# Patient Record
Sex: MALE | NOT HISPANIC OR LATINO
[De-identification: names, ages, dates, MRNs, and addresses within clinical notes are randomized per-mention and may not be internally consistent; named-entity substitution may affect disease eponyms.]

---

## 2020-04-01 PROBLEM — Z00.00 ENCOUNTER FOR PREVENTIVE HEALTH EXAMINATION: Status: ACTIVE | Noted: 2020-04-01

## 2020-06-05 ENCOUNTER — APPOINTMENT (OUTPATIENT)
Dept: UROLOGY | Facility: CLINIC | Age: 35
End: 2020-06-05

## 2022-03-11 ENCOUNTER — APPOINTMENT (OUTPATIENT)
Dept: UROLOGY | Facility: CLINIC | Age: 37
End: 2022-03-11
Payer: COMMERCIAL

## 2022-03-11 VITALS — HEIGHT: 72 IN | BODY MASS INDEX: 28.44 KG/M2 | TEMPERATURE: 98 F | WEIGHT: 210 LBS

## 2022-03-11 DIAGNOSIS — N34.2 OTHER URETHRITIS: ICD-10-CM

## 2022-03-11 DIAGNOSIS — Z78.9 OTHER SPECIFIED HEALTH STATUS: ICD-10-CM

## 2022-03-11 DIAGNOSIS — Z84.1 FAMILY HISTORY OF DISORDERS OF KIDNEY AND URETER: ICD-10-CM

## 2022-03-11 PROCEDURE — 99203 OFFICE O/P NEW LOW 30 MIN: CPT

## 2022-03-11 NOTE — ASSESSMENT
[FreeTextEntry1] : Mild resolving Mahesh Christos.  I do not see anything of concern.  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  We discussed the issues in detail and I have given him some Pyridium to use on a as needed basis as culture previously was negative and urine today is clear.  If this persists he will return and will have a ureteroscopy performed.  All his questions were otherwise answered.

## 2022-03-11 NOTE — PHYSICAL EXAM
[Abdomen Tenderness] : non-tender [Abdomen Soft] : soft [] : no hepato-splenomegaly [Abdomen Mass (___ Cm)] : no abdominal mass palpated [Abdomen Hernia] : no hernia was discovered [Costovertebral Angle Tenderness] : no ~M costovertebral angle tenderness [Penis Abnormality] : normal circumcised penis [Epididymis] : the epididymides were normal [Scrotum] : the scrotum was normal [Testes Mass (___cm)] : there were no testicular masses [FreeTextEntry1] : Right testis is moderately atrophic and minimally tender.  The meatus is slightly larger than normal.  There is minimal erythema approximately 2 to 3 mm into the meatus especially along the ventral aspect.

## 2023-08-14 NOTE — HISTORY OF PRESENT ILLNESS
[FreeTextEntry1] : 36-year-old male who noticed some burning and irritation when voiding and some sensitivity of the meatus and went to urgent care.  Apparently he was treated with Bactrim for 7 days although the urine did not grow out anything on culture according to the patient.  He had no hematuria or prior history of any UTIs.  His feels that his symptoms are resolving.  Interestingly he also had some irritation in the thigh area but that also seems to be resolving.  His IPSS is 3.\par \par Past medical history: Unremarkable past surgical history: Unremarkable\par \par Allergies: None known\par \par \par Medications: None\par \par \par Review of systems: Otherwise noncontributory with good appetite, stable weight and regular bowels.  No chest pain shortness of breath etc.\par \par He denies any history of tobacco or alcohol use.\par \par  Benzoyl Peroxide Counseling: Patient counseled that medicine may cause skin irritation and bleach clothing.  In the event of skin irritation, the patient was advised to reduce the amount of the drug applied or use it less frequently.   The patient verbalized understanding of the proper use and possible adverse effects of benzoyl peroxide.  All of the patient's questions and concerns were addressed.

## 2024-07-29 ENCOUNTER — APPOINTMENT (OUTPATIENT)
Dept: ORTHOPEDIC SURGERY | Facility: CLINIC | Age: 39
End: 2024-07-29
Payer: COMMERCIAL

## 2024-07-29 DIAGNOSIS — M77.11 LATERAL EPICONDYLITIS, RIGHT ELBOW: ICD-10-CM

## 2024-07-29 PROCEDURE — 99204 OFFICE O/P NEW MOD 45 MIN: CPT

## 2024-07-30 PROBLEM — M77.11 EPICONDYLITIS, LATERAL, RIGHT: Status: ACTIVE | Noted: 2024-07-30

## 2024-07-30 NOTE — ASSESSMENT
[FreeTextEntry1] : Patient comes in with complaints of right elbow and forearm pain.  He says that about on 514 he has been about 2 weeks pulling weeds.  He started having forearm pain as well as pain that went down to the wrist as well as elbow pain.  He says the pain has been getting better.  He has been doing some exercises but not consistently.  He says he has difficulty grabbing things and certain things when he lifts bothers him.  When he lifts his suitcase it bothers him.  He states that about 5 years ago so he had the same problem.  It got better.  He does not have other complaints  full active range of motion of the right shoulder, wrist and fingers full active range of motion of the right elbow Tenderness to palpation of the lateral epicondyle and proximal extensor supinator mass pain with resisted wrist extension and forearm supination 4/5 strength in supination and wrist extension, otherwise 5/5 strength median/ulnar/radial sensation intact to light touch ain/pin/ulnar motor intact palpable pulses CR<2s  The patient was advised of the diagnosis. The natural history of the pathology was explained in full to the patient in layman's terms. All questions were answered. The risks and benefits of surgical and non-surgical treatment alternatives were explained in full to the patient. We discussed treatment options including nsaids, topical gels, tennis elbow strap, PT, activity modification, cortisone inj, sx .pt is aware that symptoms usually resolve on their own in 95-99% of people, but the timeframe is unknown. Home exercises/stretches were demonstrated and the patient practiced them as well- They are to do the exercises hourly and hold the stretch for 30 seconds.  Avoid repetitive wrist flexion time was spent instructing the patient on appropriate placement of the elbow strap as well.  Patient has a forearm strain secondary to the aggressive activity that he was doing.  The pain is getting better.  I discussed with him we will take a while for this to get better.  Discussed with him doing stretching on a regular basis and we went over the stretching to do.  In addition the other exercises he was doing was helping him which was using some weights and band and a bar to help him.  I recommend that the patient do that on a regular basis.  He should do the stretching at least for 5 minutes 4 times a day and the other exercises he was doing I recommend that he do at least every other day if not daily if he can.  I discussed with the patient this can take up to a year to improve but as long as he is improving things are going in the right direction.  He will follow-up most likely as needed.